# Patient Record
Sex: FEMALE | Race: WHITE | NOT HISPANIC OR LATINO
[De-identification: names, ages, dates, MRNs, and addresses within clinical notes are randomized per-mention and may not be internally consistent; named-entity substitution may affect disease eponyms.]

---

## 2020-03-12 ENCOUNTER — TRANSCRIPTION ENCOUNTER (OUTPATIENT)
Age: 35
End: 2020-03-12

## 2020-03-22 ENCOUNTER — TRANSCRIPTION ENCOUNTER (OUTPATIENT)
Age: 35
End: 2020-03-22

## 2021-01-18 ENCOUNTER — TRANSCRIPTION ENCOUNTER (OUTPATIENT)
Age: 36
End: 2021-01-18

## 2022-05-13 PROBLEM — Z00.00 ENCOUNTER FOR PREVENTIVE HEALTH EXAMINATION: Status: ACTIVE | Noted: 2022-05-13

## 2022-06-01 ENCOUNTER — NON-APPOINTMENT (OUTPATIENT)
Age: 37
End: 2022-06-01

## 2022-06-06 ENCOUNTER — APPOINTMENT (OUTPATIENT)
Dept: OBGYN | Facility: CLINIC | Age: 37
End: 2022-06-06
Payer: MEDICAID

## 2022-06-06 VITALS
HEIGHT: 65 IN | SYSTOLIC BLOOD PRESSURE: 108 MMHG | BODY MASS INDEX: 34.99 KG/M2 | RESPIRATION RATE: 16 BRPM | WEIGHT: 210 LBS | DIASTOLIC BLOOD PRESSURE: 62 MMHG

## 2022-06-06 DIAGNOSIS — Z78.9 OTHER SPECIFIED HEALTH STATUS: ICD-10-CM

## 2022-06-06 DIAGNOSIS — F41.9 ANXIETY DISORDER, UNSPECIFIED: ICD-10-CM

## 2022-06-06 DIAGNOSIS — Z80.3 FAMILY HISTORY OF MALIGNANT NEOPLASM OF BREAST: ICD-10-CM

## 2022-06-06 LAB
HCG UR QL: NEGATIVE
QUALITY CONTROL: YES

## 2022-06-06 PROCEDURE — 81025 URINE PREGNANCY TEST: CPT

## 2022-06-06 PROCEDURE — 99204 OFFICE O/P NEW MOD 45 MIN: CPT

## 2022-06-06 RX ORDER — FLUCONAZOLE 150 MG/1
150 TABLET ORAL
Qty: 2 | Refills: 0 | Status: DISCONTINUED | COMMUNITY
Start: 2022-05-24

## 2022-06-06 RX ORDER — OXYCODONE AND ACETAMINOPHEN 5; 325 MG/1; MG/1
5-325 TABLET ORAL
Qty: 15 | Refills: 0 | Status: DISCONTINUED | COMMUNITY
Start: 2022-01-20

## 2022-06-06 RX ORDER — ESCITALOPRAM OXALATE 10 MG/1
10 TABLET ORAL
Qty: 90 | Refills: 0 | Status: DISCONTINUED | COMMUNITY
Start: 2022-02-23

## 2022-06-06 RX ORDER — PHENAZOPYRIDINE HYDROCHLORIDE 100 MG/1
100 TABLET ORAL
Qty: 6 | Refills: 0 | Status: DISCONTINUED | COMMUNITY
Start: 2022-05-19

## 2022-06-06 RX ORDER — IBUPROFEN 600 MG/1
600 TABLET, FILM COATED ORAL
Qty: 60 | Refills: 0 | Status: DISCONTINUED | COMMUNITY
Start: 2022-01-20

## 2022-06-06 RX ORDER — ESCITALOPRAM OXALATE 20 MG/1
20 TABLET, FILM COATED ORAL
Refills: 0 | Status: DISCONTINUED | COMMUNITY

## 2022-06-06 RX ORDER — SERTRALINE HYDROCHLORIDE 50 MG/1
50 TABLET, FILM COATED ORAL
Qty: 45 | Refills: 0 | Status: ACTIVE | COMMUNITY
Start: 2022-06-03

## 2022-06-06 RX ORDER — NITROFURANTOIN (MONOHYDRATE/MACROCRYSTALS) 25; 75 MG/1; MG/1
100 CAPSULE ORAL
Qty: 14 | Refills: 0 | Status: DISCONTINUED | COMMUNITY
Start: 2022-05-10

## 2022-06-10 NOTE — HISTORY OF PRESENT ILLNESS
[Patient reported PAP Smear was abnormal] : Patient reported PAP Smear was abnormal [N] : Patient does not use contraception [Y] : Positive pregnancy history [Currently Active] : currently active [Men] : men [No] : No [PapSmeardate] : 03/01/22 [TextBox_31] : ASCUS HPV negative [TextBox_78] : HPV in past [LMPDate] : 05/23/22 [PGxTotal] : 9 [BannerxFullTerm] : 2 [PGxPremature] : 1 [[facility]xLiving] : 2 [PGHxABInduced] : 6 [PGxEctopic] : 1 [FreeTextEntry1] : NSVDx1 (36 wks), C/S x1 (arrest of dilation). Denies cysts, +h/o fibroids, hx abnormal pap, hx chlamydia in past.  [TextBox_9] : 15

## 2022-06-10 NOTE — DISCUSSION/SUMMARY
[FreeTextEntry1] : 36 yo with symptomatic fibroid, heavy menstrual bleeding, dysmenorrhea desiring surgical management. \par -Bloodwork referral given\par -Pelvic ultrasound for further evaluation given last ultrasound was during pregnancy. Discussed that depending on ultrasound results, she may need MRI prior to surgery\par -Return after ultrasound for follow-up, discussed need for office hysteroscopy EMBx prior to surgery\par -Reviewed options for management including OCP, patch, ring, progesterone IUD, Oriahnn, UAE, myomectomy and hysterectomy. At this time, she is unsure whether she wants to conceive in the future or not. She desires myomectomy to help with her symptoms. Based on her exam, she is a candidate for minimally invasive robotic myomectomy. We will discuss this further after her ultrasound. We briefly reviewed the surgery and recovery time and expectations. \par -Return after ultrasound for follow-up\par \par

## 2022-06-10 NOTE — CONSULT LETTER
[Dear  ___] : Dear  [unfilled], [FreeTextEntry1] : I had the pleasure of evaluating your patient, FABIAN FAULKNER. Please see my note enclosed for full details.\par \par Thank you for allowing me to participate in the care of this patient. If you have any questions, please do not hesitate to contact me.\par \par Sincerely,\par \par Prema Prieto MD, FACOG \par [facility]\par Obstetrics and Gynecology in [place]\[address], [facility] [address]\[place] [address]\[facility] Phone: [phone] Fax: [phone]

## 2022-06-10 NOTE — PHYSICAL EXAM
[Chaperone Present] : A chaperone was present in the examining room during all aspects of the physical examination [Labia Majora] : normal [Labia Minora] : normal [Normal] : normal [Uterine Adnexae] : normal [FreeTextEntry1] : DALJIT Orosco  [Appropriately responsive] : appropriately responsive [Alert] : alert [No Acute Distress] : no acute distress [No Lymphadenopathy] : no lymphadenopathy [Regular Rate Rhythm] : regular rate rhythm [No Murmurs] : no murmurs [Clear to Auscultation B/L] : clear to auscultation bilaterally [Soft] : soft [Non-tender] : non-tender [Non-distended] : non-distended [No HSM] : No HSM [No Lesions] : no lesions [No Mass] : no mass [Oriented x3] : oriented x3 [FreeTextEntry7] : pfannensteil incision well-healed [Tenderness] : nontender [FreeTextEntry6] : 12 week globular mobile uterus palpable fundal/post fibroid

## 2022-10-20 ENCOUNTER — APPOINTMENT (OUTPATIENT)
Dept: ULTRASOUND IMAGING | Facility: CLINIC | Age: 37
End: 2022-10-20

## 2022-10-20 PROCEDURE — 76856 US EXAM PELVIC COMPLETE: CPT

## 2022-10-20 PROCEDURE — 76830 TRANSVAGINAL US NON-OB: CPT

## 2022-10-24 ENCOUNTER — APPOINTMENT (OUTPATIENT)
Dept: OBGYN | Facility: CLINIC | Age: 37
End: 2022-10-24

## 2022-10-24 VITALS
RESPIRATION RATE: 16 BRPM | HEIGHT: 65 IN | DIASTOLIC BLOOD PRESSURE: 64 MMHG | BODY MASS INDEX: 34.99 KG/M2 | WEIGHT: 210 LBS | SYSTOLIC BLOOD PRESSURE: 110 MMHG

## 2022-10-24 PROCEDURE — 99214 OFFICE O/P EST MOD 30 MIN: CPT

## 2022-10-24 NOTE — PHYSICAL EXAM
[Chaperone Present] : A chaperone was present in the examining room during all aspects of the physical examination [FreeTextEntry1] : DALJIT Orosco  [Appropriately responsive] : appropriately responsive [Alert] : alert [No Acute Distress] : no acute distress [No Lymphadenopathy] : no lymphadenopathy [Regular Rate Rhythm] : regular rate rhythm [No Murmurs] : no murmurs [Clear to Auscultation B/L] : clear to auscultation bilaterally [Soft] : soft [Non-tender] : non-tender [Non-distended] : non-distended [No HSM] : No HSM [No Lesions] : no lesions [No Mass] : no mass [Oriented x3] : oriented x3 [FreeTextEntry7] : pfannensteil incision well-healed [Labia Majora] : normal [Labia Minora] : normal [Normal] : normal [Tenderness] : nontender [Uterine Adnexae] : normal [FreeTextEntry6] : 12 week globular mobile uterus palpable fundal/post fibroid

## 2022-10-24 NOTE — HISTORY OF PRESENT ILLNESS
[FreeTextEntry1] : Pt here for follow-up. Continues to have heavy menstrual bleeding. \par Ultrasound report not available, pt had done last Thursday. I reviewed images and large 8cm posterior subserosal fibroid noted, ovaries normal, small nabothian cyst. \par \par Pt c/o intermittent vaginal burning, went to urgent care and all testing negative. Started using boric acid suppositories.Burning has currently resolved. \par \par Ultrasound findings reviewed with pt. Discussed medical vs surgical options, pt desires surgery. Pt desires myomectomy.

## 2022-10-24 NOTE — DISCUSSION/SUMMARY
[FreeTextEntry1] : 36 yo with symptomatic fibroids, recent vaginal burning. \par 1) Vaginal burning:\par Vaginitis plus panel\par \par 2) Fibroids:\par Pelvic MRI for surgical planning\par Tentative plan for pelvic EUA, Robot-assisted laparoscopic myomectomy. \par She will be contacted with a surgical date\par Does not need medical clearance.

## 2022-11-23 RX ORDER — FLUCONAZOLE 150 MG/1
150 TABLET ORAL
Qty: 6 | Refills: 0 | Status: ACTIVE | COMMUNITY
Start: 2022-10-24 | End: 1900-01-01

## 2023-03-23 ENCOUNTER — NON-APPOINTMENT (OUTPATIENT)
Age: 38
End: 2023-03-23

## 2023-03-27 ENCOUNTER — RESULT CHARGE (OUTPATIENT)
Age: 38
End: 2023-03-27

## 2023-03-27 ENCOUNTER — APPOINTMENT (OUTPATIENT)
Dept: OBGYN | Facility: CLINIC | Age: 38
End: 2023-03-27
Payer: MEDICAID

## 2023-03-27 VITALS
WEIGHT: 210 LBS | DIASTOLIC BLOOD PRESSURE: 70 MMHG | BODY MASS INDEX: 34.99 KG/M2 | SYSTOLIC BLOOD PRESSURE: 110 MMHG | HEIGHT: 65 IN

## 2023-03-27 LAB
BILIRUB UR QL STRIP: NORMAL
CLARITY UR: CLEAR
COLLECTION METHOD: NORMAL
GLUCOSE UR-MCNC: NORMAL
HCG UR QL: 0.2 EU/DL
HCG UR QL: NEGATIVE
HGB UR QL STRIP.AUTO: NORMAL
KETONES UR-MCNC: NORMAL
LEUKOCYTE ESTERASE UR QL STRIP: NORMAL
NITRITE UR QL STRIP: NORMAL
PH UR STRIP: 5.5
PROT UR STRIP-MCNC: NORMAL
QUALITY CONTROL: YES
SP GR UR STRIP: 1.03

## 2023-03-27 PROCEDURE — 81003 URINALYSIS AUTO W/O SCOPE: CPT | Mod: QW

## 2023-03-27 PROCEDURE — 99214 OFFICE O/P EST MOD 30 MIN: CPT

## 2023-03-27 PROCEDURE — 81025 URINE PREGNANCY TEST: CPT

## 2023-03-27 RX ORDER — DIAZEPAM 5 MG/1
5 TABLET ORAL
Qty: 1 | Refills: 0 | Status: ACTIVE | COMMUNITY
Start: 2023-03-27 | End: 1900-01-01

## 2023-03-27 NOTE — DISCUSSION/SUMMARY
[FreeTextEntry1] : 37 yo with vaginal burning, discharge, odor. Heavy menstrual bleeding, dysmenorrhea, large fibroid. \par 1) Vaginal symptoms: \par GC/CT\par Affirm\par Udip negative\par Will contact with results\par \par 2) Heavy menstrual bleeding/dysmenorrhea/large fibroid:\par Desires myomectomy\par Needs MRI, referral given again\par Needs hysterosocpy/EMBx: Motrin 600mg 1 hr before, Valium Rx sent to take 1 hour before, needs to have someone drive her to and from appt\par \par Return after MRI for biopsy

## 2023-03-27 NOTE — HISTORY OF PRESENT ILLNESS
[FreeTextEntry1] : Pt c/o 2 weeks of vaginal discharge mucous, constant, with fishy vaginal odor. Also reports intermittent vaginal odor. LMP 3/2. c/o some burning during urination but unsure if it's from discharge making her uncomfortable. \par \par Continues to have heavy menstrual bleeding but more frequent than prior. Menses lasting 3 days heavy, 5 days total. Menses history:\par 11/10\par 12/9\par 1/8\par 2/4\par 3/2\par  \par Pt with large posterior fibroid on ultrasound. Did not get MRI done, states was anxious about EMBx. We discussed needs MRI for fibroid location and planning and needs hysterosocpy EMBx for preoperative uterine evaluation. Discussed taking Motrin prior, can give Valium. Can also provide paracervical block. Pt agrees to this.

## 2023-03-27 NOTE — PHYSICAL EXAM
[Chaperone Present] : A chaperone was present in the examining room during all aspects of the physical examination [FreeTextEntry1] : DALJIT Holland  [Appropriately responsive] : appropriately responsive [Alert] : alert [No Acute Distress] : no acute distress [No Lymphadenopathy] : no lymphadenopathy [Regular Rate Rhythm] : regular rate rhythm [No Murmurs] : no murmurs [Clear to Auscultation B/L] : clear to auscultation bilaterally [Soft] : soft [Non-tender] : non-tender [Non-distended] : non-distended [No HSM] : No HSM [No Lesions] : no lesions [No Mass] : no mass [Oriented x3] : oriented x3 [FreeTextEntry7] : pfannensteil incision well-healed [Labia Majora] : normal [Labia Minora] : normal [Normal] : normal [Tenderness] : nontender [Uterine Adnexae] : normal [FreeTextEntry4] : small amount thin white discharge [FreeTextEntry6] : 12 week globular mobile uterus palpable fundal/post fibroid

## 2023-03-27 NOTE — REASON FOR VISIT
[Follow-Up] : a follow-up evaluation of [Vulvar/Vaginal Complaint] : vulvar/vaginal complaint [FreeTextEntry2] : itching

## 2023-03-28 LAB
C TRACH RRNA SPEC QL NAA+PROBE: NOT DETECTED
CANDIDA VAG CYTO: NOT DETECTED
G VAGINALIS+PREV SP MTYP VAG QL MICRO: NOT DETECTED
N GONORRHOEA RRNA SPEC QL NAA+PROBE: NOT DETECTED
SOURCE AMPLIFICATION: NORMAL
T VAGINALIS VAG QL WET PREP: NOT DETECTED

## 2023-03-29 ENCOUNTER — NON-APPOINTMENT (OUTPATIENT)
Age: 38
End: 2023-03-29

## 2023-05-11 ENCOUNTER — NON-APPOINTMENT (OUTPATIENT)
Age: 38
End: 2023-05-11

## 2023-05-16 PROBLEM — Z87.42 HISTORY OF VAGINAL DISCHARGE: Status: RESOLVED | Noted: 2023-03-27 | Resolved: 2023-05-16

## 2023-05-18 ENCOUNTER — APPOINTMENT (OUTPATIENT)
Dept: OBGYN | Facility: CLINIC | Age: 38
End: 2023-05-18

## 2023-05-18 DIAGNOSIS — Z87.42 PERSONAL HISTORY OF OTHER DISEASES OF THE FEMALE GENITAL TRACT: ICD-10-CM

## 2023-05-22 ENCOUNTER — APPOINTMENT (OUTPATIENT)
Dept: OBGYN | Facility: CLINIC | Age: 38
End: 2023-05-22
Payer: MEDICAID

## 2023-05-22 VITALS
SYSTOLIC BLOOD PRESSURE: 116 MMHG | DIASTOLIC BLOOD PRESSURE: 78 MMHG | BODY MASS INDEX: 35.65 KG/M2 | WEIGHT: 214 LBS | HEIGHT: 65 IN

## 2023-05-22 LAB
HCG UR QL: NEGATIVE
QUALITY CONTROL: YES

## 2023-05-22 PROCEDURE — 58558Z: CUSTOM

## 2023-05-22 PROCEDURE — 81025 URINE PREGNANCY TEST: CPT

## 2023-05-22 NOTE — PHYSICAL EXAM
[Appropriately responsive] : appropriately responsive [Alert] : alert [No Acute Distress] : no acute distress [No Lymphadenopathy] : no lymphadenopathy [Regular Rate Rhythm] : regular rate rhythm [No Murmurs] : no murmurs [Clear to Auscultation B/L] : clear to auscultation bilaterally [Soft] : soft [Non-tender] : non-tender [Non-distended] : non-distended [No HSM] : No HSM [No Lesions] : no lesions [No Mass] : no mass [Oriented x3] : oriented x3 [FreeTextEntry7] : pfannensteil incision well-healed

## 2023-05-22 NOTE — PROCEDURE
[Hysteroscopy] : Hysteroscopy [Time out performed] : Pre-procedure time out performed.  Patient's name, date of birth and procedure confirmed. [Consent Obtained] : Consent obtained [Abnormal uterine bleeding] : abnormal uterine bleeding [Risks] : risks [Benefits] : benefits [Alternatives] : alternatives [Patient] : patient [Diazepam ___mg] : ~Vmg of diazepam [Sent to Pathology] : specimen was placed in buffered formalin and sent for pathology [flexible] : Using aseptic technique a hysteroscopy was performed using a flexible hysteroscope [Hemostasis obtained] : hemostasis obtained [Tolerated Well] : Patient tolerated the procedure well [Aftercare instructions/regstrictions given and follow-up scheduled] : Aftercare instructions/restrictions given and follow-up scheduled [de-identified] : Pipelle inserted to 10cm 3x, moderate tissue obtained. Delmy hysteroscope inserted, normal-appearing cavity, no masses noted, bilateral tubal ostia visualized [de-identified] : EMBx

## 2023-05-22 NOTE — REASON FOR VISIT
[Follow-Up] : a follow-up evaluation of [FreeTextEntry2] : Hysteroscopy/EMB - Heavy menstrual bleeding/dysmenorrhea/large fibroid

## 2023-05-22 NOTE — DISCUSSION/SUMMARY
[FreeTextEntry1] : 36 yo with symptomatic fibroid, heavy menstrual bleeding, dysmenorrhea now s/p hysteroscopy EMBx. \par -EMBx to pathology\par -Pelvic rest x 2 wks\par -Plan for MRI pelvis next week for surgical planning\par -Scheduled for robotic myomectomy 6/27, discussed needs MRI prior to surgery\par

## 2023-05-22 NOTE — HISTORY OF PRESENT ILLNESS
[FreeTextEntry1] : Pt here for procedure. Vaginal symptoms have improved. Did not get MRI yet. Planning to go next week.

## 2023-06-02 LAB — CORE LAB BIOPSY: NORMAL

## 2023-06-06 ENCOUNTER — NON-APPOINTMENT (OUTPATIENT)
Age: 38
End: 2023-06-06

## 2023-06-12 ENCOUNTER — APPOINTMENT (OUTPATIENT)
Dept: OBGYN | Facility: CLINIC | Age: 38
End: 2023-06-12
Payer: MEDICAID

## 2023-06-12 VITALS
WEIGHT: 214 LBS | HEIGHT: 65 IN | DIASTOLIC BLOOD PRESSURE: 60 MMHG | SYSTOLIC BLOOD PRESSURE: 112 MMHG | BODY MASS INDEX: 35.65 KG/M2

## 2023-06-12 DIAGNOSIS — N94.6 DYSMENORRHEA, UNSPECIFIED: ICD-10-CM

## 2023-06-12 DIAGNOSIS — D21.9 BENIGN NEOPLASM OF CONNECTIVE AND OTHER SOFT TISSUE, UNSPECIFIED: ICD-10-CM

## 2023-06-12 DIAGNOSIS — N92.0 EXCESSIVE AND FREQUENT MENSTRUATION WITH REGULAR CYCLE: ICD-10-CM

## 2023-06-12 PROCEDURE — 99214 OFFICE O/P EST MOD 30 MIN: CPT

## 2023-06-12 RX ORDER — OXYCODONE 5 MG/1
5 TABLET ORAL
Qty: 15 | Refills: 0 | Status: ACTIVE | COMMUNITY
Start: 2023-06-12 | End: 1900-01-01

## 2023-06-12 RX ORDER — IBUPROFEN 600 MG/1
600 TABLET, FILM COATED ORAL EVERY 6 HOURS
Qty: 50 | Refills: 1 | Status: ACTIVE | COMMUNITY
Start: 2023-06-12 | End: 1900-01-01

## 2023-06-12 RX ORDER — DOCUSATE SODIUM 100 MG/1
100 CAPSULE ORAL TWICE DAILY
Qty: 60 | Refills: 0 | Status: ACTIVE | COMMUNITY
Start: 2023-06-12 | End: 1900-01-01

## 2023-06-12 RX ORDER — ACETAMINOPHEN 500 MG/1
500 TABLET, COATED ORAL EVERY 8 HOURS
Qty: 50 | Refills: 1 | Status: ACTIVE | COMMUNITY
Start: 2023-06-12 | End: 1900-01-01

## 2023-06-12 NOTE — PHYSICAL EXAM
[FreeTextEntry1] : M [Appropriately responsive] : appropriately responsive [Alert] : alert [No Acute Distress] : no acute distress [No Lymphadenopathy] : no lymphadenopathy [Regular Rate Rhythm] : regular rate rhythm [No Murmurs] : no murmurs [Clear to Auscultation B/L] : clear to auscultation bilaterally [Soft] : soft [Non-tender] : non-tender [Non-distended] : non-distended [No HSM] : No HSM [No Lesions] : no lesions [No Mass] : no mass [Oriented x3] : oriented x3 [FreeTextEntry7] : pfannensteil incision well-healed

## 2023-06-12 NOTE — HISTORY OF PRESENT ILLNESS
[FreeTextEntry1] : Pt here for follow-up. No changes since last visit. EMBx was benign, findings reviewed. \par \par Has PST appt next week. Has MRI Pelvis scheduled next week, discussed I will review prior to surgery to confirm prior ultrasound findings and no new findings. \par \par We reviewed surgical plan for pelvic exam under anesthesia, robot-assisted laparoscopic myomectomy,  all indicated procedures. Discussed risks to include, but are not limited to, bleeding, possible transfusion, infection, fistula, damage to nearby organs, vessels, or nerves resulting in temporary or permanent injury, deep venous thrombosis, and perioperative death. Discussed risk of laparotomy if unable to have adequate visualization to complete using minimally invasive approach. We reviewed the planned location of the incisions and I showed them to her on her abdomen. Discussed goal of removing all fibroids that we can safely remove and visualize. \par \par Discussed the need for contained extraction of the fibroids in a bag when using a minimally invasive approach. We discussed that this is done with the specimens in a bag using a technique called hand morcellation with a scalpel. We discussed that morcellation is done through one of the abdominal incisions. We discussed the risk of unexpected leiomyosarcoma may range from 1 in 770 surgeries to less than 1 in 10,000 surgeries for presumed symptomatic leiomyomas.  We discussed that there are not good markers to diagnose leiomyosarcoma preoperatively. We discussed the risk of hand morcellation of a uterus containing leiomyosarcoma which could spread cancerous cells if there are possible leaks in the bag during morcellation at the time of the surgery, which could spread disease and worsen prognosis. We discussed that we take all precautions to minimize the risk of leaks in the bag to minimize this subsequent risk. We discussed the risks and benefits of minimally invasive surgery vs open abdominal myomectomy. She understands these risks and benefits and wishes to proceed with minimally invasive surgery. \par \par She also understands the limitations of laparoscopic surgery and the possibility of missing a surgical complication with need for subsequent re-exploration. \par \par Postoperative pain management was reviewed with plan for alternating Tylenol and Motrin with Oxycodone for breakthrough pain. Discussed the benefit of ice packs x first 24 hours.  Discussed postoperative expectations and restrictions. Discussed plan for preoperative misoprostol 400mcg rectally and IV tranexamic acid to help minimize blood loss during surgery. \par \par We reviewed that there will be physician assistants and nurse practitioners in the operating room who may assist in the pelvic exam for learning purposes and who will be assisting in the surgery. \par \par She agrees to proceed. All of her questions were answered to her satisfaction.

## 2023-06-12 NOTE — DISCUSSION/SUMMARY
[FreeTextEntry1] : 38 yo with symptomatic fibroid, heavy menstrual bleeding, dysmenorrhea. \par -We reviewed surgical plan for pelvic exam under anesthesia, robot-assisted laparoscopic myomectomy,  all indicated procedures \par -Postoperative expectations and restrictions reviewed\par -Rx Oxycodone/colace sent, pt to get OTC Motrin and Tylenol\par -All questions answered to her sastisfaction\par \par \par \par Labs\par Pelvic ultrasound\par Needs hyseteroscopy EMBx

## 2023-06-16 ENCOUNTER — APPOINTMENT (OUTPATIENT)
Dept: MRI IMAGING | Facility: CLINIC | Age: 38
End: 2023-06-16
Payer: MEDICAID

## 2023-06-16 PROCEDURE — A9585: CPT

## 2023-06-16 PROCEDURE — 72197 MRI PELVIS W/O & W/DYE: CPT

## 2023-06-27 ENCOUNTER — RESULT REVIEW (OUTPATIENT)
Age: 38
End: 2023-06-27

## 2023-06-27 ENCOUNTER — APPOINTMENT (OUTPATIENT)
Dept: OBGYN | Facility: HOSPITAL | Age: 38
End: 2023-06-27

## 2023-06-28 ENCOUNTER — NON-APPOINTMENT (OUTPATIENT)
Age: 38
End: 2023-06-28

## 2023-06-28 RX ORDER — ACETAMINOPHEN AND CODEINE PHOSPHATE 300; 30 MG/1; MG/1
300-30 TABLET ORAL 3 TIMES DAILY
Qty: 20 | Refills: 0 | Status: ACTIVE | COMMUNITY
Start: 2023-06-28 | End: 1900-01-01

## 2023-07-10 ENCOUNTER — APPOINTMENT (OUTPATIENT)
Dept: OBGYN | Facility: CLINIC | Age: 38
End: 2023-07-10
Payer: MEDICAID

## 2023-07-10 VITALS
WEIGHT: 214 LBS | HEIGHT: 65 IN | SYSTOLIC BLOOD PRESSURE: 118 MMHG | DIASTOLIC BLOOD PRESSURE: 70 MMHG | BODY MASS INDEX: 35.65 KG/M2

## 2023-07-10 PROCEDURE — 99024 POSTOP FOLLOW-UP VISIT: CPT

## 2023-07-12 NOTE — DISCUSSION/SUMMARY
[FreeTextEntry1] : 37 yo s/p robotic myomectomy, chromotubation, left partial salpingectomy, peritoneal biopsies, hysteroscopy, umbilical hernia repair 6/27 doing well postoperatively. \par -postoperative expectations and restrictions reviewed\par -Return in 2 months for follow-up\par -Will continue discussion for management plan of endometriosis

## 2023-07-12 NOTE — HISTORY OF PRESENT ILLNESS
[FreeTextEntry1] : 37 yo s/p robotic myomectomy, chromotubation, left partial salpingectomy, peritoneal biopsies, hysteroscopy, umbilical hernia repair 6/27 here for postop visit. States feeling well. Pain improving daily, not requiring pain medication. Tolerating regular diet, ambulating, voiding, normal BM. \par \par Pathology: peritoneal biopsies: endometriosis, portion of left proximal fallopain tube: benign paratubal cyst, fibroids, fibroadipose tissue consistent with hernia sac\par \par Intraoperative and pathology findings reviewed, all questions answered. Discussed chronic hx of endometriosis. Pt has been on oral contraception in past with untoward side effects.

## 2023-07-12 NOTE — PHYSICAL EXAM
[Chaperone Present] : A chaperone was present in the examining room during all aspects of the physical examination [FreeTextEntry1] : DALJIT Kamara  [Appropriately responsive] : appropriately responsive [Alert] : alert [No Acute Distress] : no acute distress [No Lymphadenopathy] : no lymphadenopathy [Regular Rate Rhythm] : regular rate rhythm [No Murmurs] : no murmurs [Clear to Auscultation B/L] : clear to auscultation bilaterally [Soft] : soft [Non-tender] : non-tender [Non-distended] : non-distended [No HSM] : No HSM [No Lesions] : no lesions [No Mass] : no mass [Oriented x3] : oriented x3 [FreeTextEntry7] : incisions c/d/i, no erythema/drainage

## 2023-09-13 ENCOUNTER — APPOINTMENT (OUTPATIENT)
Dept: OBGYN | Facility: CLINIC | Age: 38
End: 2023-09-13
Payer: MEDICAID

## 2023-09-13 VITALS
WEIGHT: 202 LBS | BODY MASS INDEX: 33.66 KG/M2 | DIASTOLIC BLOOD PRESSURE: 78 MMHG | HEIGHT: 65 IN | SYSTOLIC BLOOD PRESSURE: 116 MMHG

## 2023-09-13 DIAGNOSIS — R19.05 PERIUMBILIC SWELLING, MASS OR LUMP: ICD-10-CM

## 2023-09-13 DIAGNOSIS — Z48.89 ENCOUNTER FOR OTHER SPECIFIED SURGICAL AFTERCARE: ICD-10-CM

## 2023-09-13 DIAGNOSIS — N94.9 UNSPECIFIED CONDITION ASSOCIATED WITH FEMALE GENITAL ORGANS AND MENSTRUAL CYCLE: ICD-10-CM

## 2023-09-13 PROCEDURE — 99213 OFFICE O/P EST LOW 20 MIN: CPT | Mod: 24

## 2023-09-13 PROCEDURE — 99024 POSTOP FOLLOW-UP VISIT: CPT

## 2023-09-18 LAB
CANDIDA VAG CYTO: NOT DETECTED
G VAGINALIS+PREV SP MTYP VAG QL MICRO: NOT DETECTED
T VAGINALIS VAG QL WET PREP: NOT DETECTED

## 2023-09-25 ENCOUNTER — NON-APPOINTMENT (OUTPATIENT)
Age: 38
End: 2023-09-25

## 2023-11-20 ENCOUNTER — LABORATORY RESULT (OUTPATIENT)
Age: 38
End: 2023-11-20

## 2023-11-20 ENCOUNTER — APPOINTMENT (OUTPATIENT)
Dept: OBGYN | Facility: CLINIC | Age: 38
End: 2023-11-20
Payer: MEDICAID

## 2023-11-20 VITALS
HEIGHT: 65 IN | SYSTOLIC BLOOD PRESSURE: 118 MMHG | BODY MASS INDEX: 31.82 KG/M2 | WEIGHT: 191 LBS | DIASTOLIC BLOOD PRESSURE: 80 MMHG

## 2023-11-20 DIAGNOSIS — N89.8 OTHER SPECIFIED NONINFLAMMATORY DISORDERS OF VAGINA: ICD-10-CM

## 2023-11-20 LAB
BILIRUB UR QL STRIP: ABNORMAL
CLARITY UR: CLEAR
COLLECTION METHOD: NORMAL
GLUCOSE UR-MCNC: NEGATIVE
HCG UR QL: 0.2 EU/DL
HCG UR QL: NEGATIVE
HGB UR QL STRIP.AUTO: ABNORMAL
KETONES UR-MCNC: NEGATIVE
LEUKOCYTE ESTERASE UR QL STRIP: NEGATIVE
NITRITE UR QL STRIP: NEGATIVE
PH UR STRIP: 6
PROT UR STRIP-MCNC: 30
SP GR UR STRIP: >=1.03

## 2023-11-20 PROCEDURE — 81003 URINALYSIS AUTO W/O SCOPE: CPT | Mod: QW

## 2023-11-20 PROCEDURE — 99213 OFFICE O/P EST LOW 20 MIN: CPT

## 2023-11-20 PROCEDURE — 81025 URINE PREGNANCY TEST: CPT

## 2023-11-22 LAB
APPEARANCE: ABNORMAL
BACTERIA: ABNORMAL /HPF
BILIRUBIN URINE: NEGATIVE
BLOOD URINE: ABNORMAL
COLOR: YELLOW
GLUCOSE QUALITATIVE U: ABNORMAL
KETONES URINE: NEGATIVE
LEUKOCYTE ESTERASE URINE: NEGATIVE
MICROSCOPIC-UA: NORMAL
NITRITE URINE: NEGATIVE
PH URINE: 6
PROTEIN URINE: ABNORMAL
RED BLOOD CELLS URINE: NORMAL /HPF
SPECIFIC GRAVITY URINE: >=1.03
SQUAMOUS EPITHELIAL CELLS: PRESENT
URINE COMMENTS: NORMAL
UROBILINOGEN URINE: NORMAL
WHITE BLOOD CELLS URINE: 3 /HPF

## 2023-11-28 LAB
BACTERIA UR CULT: NORMAL
C TRACH RRNA SPEC QL NAA+PROBE: NOT DETECTED
MYCOPLASMA HOMINIS CULTURE: NEGATIVE
N GONORRHOEA RRNA SPEC QL NAA+PROBE: NOT DETECTED
SOURCE AMPLIFICATION: NORMAL
UREAPLASMA CULTURE: NEGATIVE

## 2024-01-26 ENCOUNTER — NON-APPOINTMENT (OUTPATIENT)
Age: 39
End: 2024-01-26

## 2024-01-29 ENCOUNTER — APPOINTMENT (OUTPATIENT)
Dept: OBGYN | Facility: CLINIC | Age: 39
End: 2024-01-29
Payer: MEDICAID

## 2024-01-29 VITALS
BODY MASS INDEX: 30.82 KG/M2 | DIASTOLIC BLOOD PRESSURE: 72 MMHG | HEIGHT: 65 IN | SYSTOLIC BLOOD PRESSURE: 116 MMHG | WEIGHT: 185 LBS

## 2024-01-29 DIAGNOSIS — N94.89 OTHER SPECIFIED CONDITIONS ASSOCIATED WITH FEMALE GENITAL ORGANS AND MENSTRUAL CYCLE: ICD-10-CM

## 2024-01-29 DIAGNOSIS — Z01.419 ENCOUNTER FOR GYNECOLOGICAL EXAMINATION (GENERAL) (ROUTINE) W/OUT ABNORMAL FINDINGS: ICD-10-CM

## 2024-01-29 DIAGNOSIS — N94.818 OTHER VULVODYNIA: ICD-10-CM

## 2024-01-29 PROCEDURE — 99395 PREV VISIT EST AGE 18-39: CPT

## 2024-01-29 RX ORDER — HALOBETASOL PROPIONATE 0.5 MG/G
0.05 OINTMENT TOPICAL TWICE DAILY
Qty: 1 | Refills: 0 | Status: ACTIVE | COMMUNITY
Start: 2024-01-29 | End: 1900-01-01

## 2024-01-29 NOTE — DISCUSSION/SUMMARY
[FreeTextEntry1] : Denise came in for annual exam. Continues to have intermittent vulvar  symptoms.  Annual: Exam as above. Pap done.  Vulvar irritation: Discussed with her trial of topical steroids for 1 week to see if it alleviates symptoms when they occur. Discussed with her changing to Free and clear detergent, none bleached tampon/pads, using unscented gentle soap and cotton underwear. Discussed with her that when she has an episode of discharge with vulvar irritation I would like her to come to the office to be examined.  The patient inquired regarding mammograms given her family history.  I discussed with her that mammograms are initiated at age 40 unless there is symptomatic issue with the breast or if there is a palpable mass. I discussed with her finding out if her  maternal aunt had genetic testing done, and if so what the results are.  I discussed with her the option to do genetic testing to assess predisposition for breast cancer.  I discussed with her that if she ever experiences breast pain, palpates any breast lesion, she should come in and be referred for a mammogram and breast ultrasound. We reviewed performing breast exam.  All her questions were answered. Prescription for halobetasol was sent to her pharmacy.

## 2024-01-29 NOTE — PHYSICAL EXAM
[Chaperone Present] : A chaperone was present in the examining room during all aspects of the physical examination [Appropriately responsive] : appropriately responsive [Alert] : alert [No Acute Distress] : no acute distress [Soft] : soft [Non-tender] : non-tender [Non-distended] : non-distended [Oriented x3] : oriented x3 [Examination Of The Breasts] : a normal appearance [No Masses] : no breast masses were palpable [Labia Majora] : normal [Labia Minora] : normal [Normal] : normal [Uterine Adnexae] : normal [FreeTextEntry1] : No significant vulvar abnormalities noted. [FreeTextEntry4] :   No discharge currently.

## 2024-01-29 NOTE — HISTORY OF PRESENT ILLNESS
[FreeTextEntry1] : Denise is coming in for annual exam. History of vulvovaginal symptoms.  Workup for BV, Candida and other infectious etiologies was negative.  She continues to have intermittent vulvar symptoms, especially after intercourse, where she notes discharge that then causes vulvar irritation. Pap 2 years ago negative. LMP January 9, 2024. She reports she had some irregular menstruation after her previous delivery. We reviewed her last year.  Over the past year her menstruation was overall normal, about 29 days between cycles.  Prior to that she had some irregular menstruation. She reports family history of breast cancer for maternal grandmother and maternal aunt.  She is unsure if genetic testing was done.

## 2024-01-30 LAB — HPV HIGH+LOW RISK DNA PNL CVX: NOT DETECTED

## 2024-02-01 LAB — CYTOLOGY CVX/VAG DOC THIN PREP: NORMAL

## 2024-03-12 ENCOUNTER — APPOINTMENT (OUTPATIENT)
Dept: SURGERY | Facility: CLINIC | Age: 39
End: 2024-03-12
Payer: MEDICAID

## 2024-03-12 VITALS
WEIGHT: 180 LBS | DIASTOLIC BLOOD PRESSURE: 80 MMHG | BODY MASS INDEX: 29.99 KG/M2 | HEIGHT: 65 IN | SYSTOLIC BLOOD PRESSURE: 118 MMHG

## 2024-03-12 DIAGNOSIS — Z80.0 FAMILY HISTORY OF MALIGNANT NEOPLASM OF DIGESTIVE ORGANS: ICD-10-CM

## 2024-03-12 DIAGNOSIS — N64.52 NIPPLE DISCHARGE: ICD-10-CM

## 2024-03-12 DIAGNOSIS — N64.4 MASTODYNIA: ICD-10-CM

## 2024-03-12 DIAGNOSIS — Z80.52 FAMILY HISTORY OF MALIGNANT NEOPLASM OF BLADDER: ICD-10-CM

## 2024-03-12 PROCEDURE — 99203 OFFICE O/P NEW LOW 30 MIN: CPT

## 2024-03-12 NOTE — PAST MEDICAL HISTORY
[Menarche Age ____] : age at menarche was [unfilled] [Definite ___ (Date)] : the last menstrual period was [unfilled] [Normal Amount/Duration] : it was of a normal amount and duration [Total Preg ___] : G[unfilled] [Live Births ___] : P[unfilled]  [Age At Live Birth ___] : Age at live birth: [unfilled] [FreeTextEntry5] : c section, fibroid removal [FreeTextEntry7] : denies  [FreeTextEntry6] : dara  [FreeTextEntry8] : denies

## 2024-03-12 NOTE — HISTORY OF PRESENT ILLNESS
[FreeTextEntry1] : Problem List: 1. Bilateral nipple discharge  2. Bilateral breast mastalgia    Care Team:  PCP - Dr. Anderson  HPI: Patient is a 39 year old female presenting for initial consultation on 3/12/24 for bilateral breast pain and nipple discharge. Patient states on march 1st started experiencing pain in both breasts. She started manipulating nipples and noticed discharge from right breast, that was clear to yellow in color, she was unable to get discharge from left breast recently. However in the past year patient has had bilateral nipple discharge that has been clear when manipulating the nipple, states it has never been spontaneous. Patient denies injuries or trauma to the area. Yesterday patient noticed swollen lymph node in the neck went to [facility] had lab work drawn chest xray that was normal. Patient denies any recent URI/sick symptoms.   Breast History: No prior breast biopsy. Family history of breast cancer maternal aunt 69 and maternal grandmother 69.

## 2024-03-12 NOTE — PHYSICAL EXAM
[Normocephalic] : normocephalic [EOMI] : extra ocular movement intact [Atraumatic] : atraumatic [PERRL] : pupils equal, round and reactive to light [Sclera nonicteric] : sclera nonicteric [Conjunctiva pink] : conjunctiva pink [Supple] : supple [No Supraclavicular Adenopathy] : no supraclavicular adenopathy [No Cervical Adenopathy] : no cervical adenopathy [Examined in the supine and seated position] : examined in the supine and seated position [Symmetrical] : symmetrical [Grade 2] : Ptosis Grade 2 [Bra Size: ___] : Bra Size: [unfilled] [No dominant masses] : no dominant masses in right breast  [No dominant masses] : no dominant masses left breast [No Nipple Retraction] : no left nipple retraction [No Nipple Discharge] : no left nipple discharge [de-identified] : clear discharge when manipulating nipple from multiple ducts

## 2024-03-12 NOTE — ASSESSMENT
[FreeTextEntry1] : Patient is 39 year old female presenting for bilateral nipple discharge/pain. No recent breast imaging.   CBE is benign. Right breast clear nipple discharge noted on manipulation from multiple ducts.  We discussed the difference between pathologic and physiologic nipple discharge. Physiologic discharge is typically clear, bilateral, multiple ducts, and on manipulation. Pathologic tends to be unilateral, single or only a few ducts, bloody, and spontaneous. Will get TSH/prolactin blood work in the office to evaluate. Advised patient to stop manipulating nipples.   We discussed other possible etiologies of her breast pain including hormonal shifts with fibrocystic changes and musculoskeletal pain. I recommended wearing a supportive, compressive bra day and night, and to take ibuprofen or Tylenol as needed. Additionally, ice packs or warm compresses can be helpful. We also discussed other lifestyle modifications including decreasing caffeine, salt, and saturated fat consumption. She can also try taking vitamin E, primrose oil or flaxseed/omega acid supplements--all of which are not necessarily supported by medical literature but are of minimum risk and may be able to help. Tobacco and alcohol use can also be triggers.    Will obtain diagnostic MMG/US now to further evaluate. Will call patient with results and discuss next steps  Patient verbalized understanding of all treatment plans. All of her questions were answered to the best of my ability. She was encouraged to call the office for any questions or concerns sooner.   Plan 1. Diagnostic MMG/US bilateral now 2. Lifestyle changes/supplements 3. Advised to not manipulate nipples 4. Follow up TBD

## 2024-03-13 ENCOUNTER — NON-APPOINTMENT (OUTPATIENT)
Age: 39
End: 2024-03-13

## 2024-03-13 LAB
PROLACTIN SERPL-MCNC: 11.8 NG/ML
TSH SERPL-ACNC: 1.05 UIU/ML

## 2024-03-15 ENCOUNTER — NON-APPOINTMENT (OUTPATIENT)
Age: 39
End: 2024-03-15

## 2024-03-15 LAB — T4 SERPL-MCNC: 8 UG/DL

## 2024-03-27 DIAGNOSIS — R92.8 OTHER ABNORMAL AND INCONCLUSIVE FINDINGS ON DIAGNOSTIC IMAGING OF BREAST: ICD-10-CM

## 2024-09-20 ENCOUNTER — APPOINTMENT (OUTPATIENT)
Dept: SURGERY | Facility: CLINIC | Age: 39
End: 2024-09-20